# Patient Record
Sex: MALE | Race: WHITE | NOT HISPANIC OR LATINO
[De-identification: names, ages, dates, MRNs, and addresses within clinical notes are randomized per-mention and may not be internally consistent; named-entity substitution may affect disease eponyms.]

---

## 2018-11-08 ENCOUNTER — NON-APPOINTMENT (OUTPATIENT)
Age: 68
End: 2018-11-08

## 2018-11-08 ENCOUNTER — APPOINTMENT (OUTPATIENT)
Dept: CARDIOLOGY | Facility: CLINIC | Age: 68
End: 2018-11-08
Payer: MEDICARE

## 2018-11-08 VITALS
OXYGEN SATURATION: 94 % | DIASTOLIC BLOOD PRESSURE: 82 MMHG | HEIGHT: 70 IN | SYSTOLIC BLOOD PRESSURE: 127 MMHG | HEART RATE: 64 BPM | WEIGHT: 223 LBS | BODY MASS INDEX: 31.92 KG/M2 | RESPIRATION RATE: 14 BRPM

## 2018-11-08 DIAGNOSIS — I45.10 UNSPECIFIED RIGHT BUNDLE-BRANCH BLOCK: ICD-10-CM

## 2018-11-08 DIAGNOSIS — G47.00 INSOMNIA, UNSPECIFIED: ICD-10-CM

## 2018-11-08 PROCEDURE — 99204 OFFICE O/P NEW MOD 45 MIN: CPT | Mod: 25

## 2018-11-08 RX ORDER — MIRABEGRON 50 MG/1
50 TABLET, FILM COATED, EXTENDED RELEASE ORAL
Refills: 0 | Status: ACTIVE | COMMUNITY
Start: 2018-11-08

## 2018-11-08 NOTE — DISCUSSION/SUMMARY
[FreeTextEntry1] : The patient is a 68-year-old [place] speaking gentleman BPH, hypertension with insomnia and heavy snoring. RBBB is unchanged from prior. Recommend sleep study.

## 2018-11-08 NOTE — REVIEW OF SYSTEMS
[Shortness Of Breath] : shortness of breath [Dyspnea on exertion] : not dyspnea during exertion [Chest Pain] : no chest pain [Lower Ext Edema] : no extremity edema [Palpitations] : no palpitations [Negative] : Heme/Lymph

## 2018-11-08 NOTE — PHYSICAL EXAM
[General Appearance - Well Developed] : well developed [Normal Appearance] : normal appearance [Well Groomed] : well groomed [General Appearance - Well Nourished] : well nourished [No Deformities] : no deformities [General Appearance - In No Acute Distress] : no acute distress [Normal Conjunctiva] : the conjunctiva exhibited no abnormalities [Eyelids - No Xanthelasma] : the eyelids demonstrated no xanthelasmas [Normal Oral Mucosa] : normal oral mucosa [No Oral Pallor] : no oral pallor [No Oral Cyanosis] : no oral cyanosis [Normal Jugular Venous A Waves Present] : normal jugular venous A waves present [Normal Jugular Venous V Waves Present] : normal jugular venous V waves present [No Jugular Venous White A Waves] : no jugular venous white A waves [Heart Rate And Rhythm] : heart rate and rhythm were normal [Heart Sounds] : normal S1 and S2 [Murmurs] : no murmurs present [Respiration, Rhythm And Depth] : normal respiratory rhythm and effort [Exaggerated Use Of Accessory Muscles For Inspiration] : no accessory muscle use [Auscultation Breath Sounds / Voice Sounds] : lungs were clear to auscultation bilaterally [Abdomen Soft] : soft [Abdomen Tenderness] : non-tender [Abdomen Mass (___ Cm)] : no abdominal mass palpated [Abnormal Walk] : normal gait [Gait - Sufficient For Exercise Testing] : the gait was sufficient for exercise testing [Nail Clubbing] : no clubbing of the fingernails [Cyanosis, Localized] : no localized cyanosis [Petechial Hemorrhages (___cm)] : no petechial hemorrhages [Skin Color & Pigmentation] : normal skin color and pigmentation [] : no rash [No Venous Stasis] : no venous stasis [Skin Lesions] : no skin lesions [No Skin Ulcers] : no skin ulcer [No Xanthoma] : no  xanthoma was observed [Oriented To Time, Place, And Person] : oriented to person, place, and time [Affect] : the affect was normal [Mood] : the mood was normal [No Anxiety] : not feeling anxious

## 2018-11-08 NOTE — HISTORY OF PRESENT ILLNESS
[FreeTextEntry1] : Minesh is a 68-year-old gentleman hypertension, BPH with insomnia and heavy snoring. He also has RBBB and multiple stress and echo in past with Dr. Jenkins. Here today asking for sleep study.

## 2019-01-14 ENCOUNTER — RESULT CHARGE (OUTPATIENT)
Age: 69
End: 2019-01-14

## 2019-02-10 PROBLEM — I45.10 RIGHT BUNDLE BRANCH BLOCK (RBBB): Status: ACTIVE | Noted: 2018-11-08

## 2019-03-11 ENCOUNTER — APPOINTMENT (OUTPATIENT)
Dept: INTERNAL MEDICINE | Facility: CLINIC | Age: 69
End: 2019-03-11
Payer: MEDICARE

## 2019-03-11 VITALS
OXYGEN SATURATION: 96 % | RESPIRATION RATE: 12 BRPM | HEART RATE: 60 BPM | WEIGHT: 221 LBS | HEIGHT: 70 IN | SYSTOLIC BLOOD PRESSURE: 142 MMHG | TEMPERATURE: 97.9 F | BODY MASS INDEX: 31.64 KG/M2 | DIASTOLIC BLOOD PRESSURE: 81 MMHG

## 2019-03-11 DIAGNOSIS — I10 ESSENTIAL (PRIMARY) HYPERTENSION: ICD-10-CM

## 2019-03-11 PROCEDURE — 99204 OFFICE O/P NEW MOD 45 MIN: CPT

## 2019-03-11 NOTE — PHYSICAL EXAM
[No Acute Distress] : no acute distress [Well Nourished] : well nourished [Well Developed] : well developed [Well-Appearing] : well-appearing [Normal Sclera/Conjunctiva] : normal sclera/conjunctiva [PERRL] : pupils equal round and reactive to light [EOMI] : extraocular movements intact [Normal Outer Ear/Nose] : the outer ears and nose were normal in appearance [Normal Oropharynx] : the oropharynx was normal [No JVD] : no jugular venous distention [Supple] : supple [No Lymphadenopathy] : no lymphadenopathy [No Respiratory Distress] : no respiratory distress  [Clear to Auscultation] : lungs were clear to auscultation bilaterally [No Accessory Muscle Use] : no accessory muscle use [Normal Rate] : normal rate  [Regular Rhythm] : with a regular rhythm [Normal S1, S2] : normal S1 and S2 [No Murmur] : no murmur heard [No Edema] : there was no peripheral edema [No Extremity Clubbing/Cyanosis] : no extremity clubbing/cyanosis [Soft] : abdomen soft [Non Tender] : non-tender [Non-distended] : non-distended [No Masses] : no abdominal mass palpated [No HSM] : no HSM [Normal Bowel Sounds] : normal bowel sounds [Normal Posterior Cervical Nodes] : no posterior cervical lymphadenopathy [Normal Anterior Cervical Nodes] : no anterior cervical lymphadenopathy [No CVA Tenderness] : no CVA  tenderness [No Joint Swelling] : no joint swelling [Grossly Normal Strength/Tone] : grossly normal strength/tone [No Rash] : no rash [Normal Gait] : normal gait [Coordination Grossly Intact] : coordination grossly intact [No Focal Deficits] : no focal deficits [Normal Affect] : the affect was normal [Normal Insight/Judgement] : insight and judgment were intact

## 2019-04-08 NOTE — HISTORY OF PRESENT ILLNESS
[de-identified] : 67 yo male, with Hx of HTN, BPH, presents for initial evaluation/establish care\par Reports compliance with taking his meds daily\par He is doing well, offers no complaints.\par

## 2019-04-08 NOTE — ASSESSMENT
[FreeTextEntry1] : HTN, BPH\par conrt current meds\par reinforced low salt diet\par \par f/u in 3 months

## 2020-01-21 ENCOUNTER — APPOINTMENT (OUTPATIENT)
Dept: ORTHOPEDIC SURGERY | Facility: CLINIC | Age: 70
End: 2020-01-21
Payer: MEDICARE

## 2020-01-21 PROCEDURE — 99204 OFFICE O/P NEW MOD 45 MIN: CPT

## 2020-01-21 PROCEDURE — 73562 X-RAY EXAM OF KNEE 3: CPT | Mod: 50

## 2020-01-21 RX ORDER — METHYLPREDNISOLONE 4 MG/1
4 TABLET ORAL
Qty: 1 | Refills: 0 | Status: ACTIVE | COMMUNITY
Start: 2020-01-21 | End: 1900-01-01

## 2020-01-21 NOTE — HISTORY OF PRESENT ILLNESS
[de-identified] : patient is here to complaints of left knee pain and also lumbar pain patient states he's been having difficulty with low back discomfort for at least 3 months. He has some radiating pain to the left thigh and leg. He denies any obvious weakness and also denies any change in bowel or bladder habits. Patient is complaining of left knee pain as well. This is about3 months duration as well.patient has an outside MRI which shows fairly severe degenerative changes of the L4-5 intervertebral disc as well as foraminal narrowing at that level.

## 2020-01-21 NOTE — REASON FOR VISIT
[Initial Visit] : an initial visit for [FreeTextEntry2] : RIGHT LOW BACK PAIN AND ALSO LEFT KNEE PAIN. - HAS MRI FOR LOW BACK AND SENT FOR XRAYS OF KNEES

## 2020-01-21 NOTE — DISCUSSION/SUMMARY
[de-identified] : patient placed on a Medrol Dosepak. Return in a week if not improved a referral to pain management specialist may be warranted.

## 2020-01-21 NOTE — PHYSICAL EXAM
[de-identified] : AP standing individual lateral and sunrise views of both knees were obtained show well-preserved joint space in all 3 compartments of both knees. [de-identified] : patient has moderate paraspinal tenderness palpation of the lower lumbar segments. Has a full passive external and internal rotation of the left hip negative straight leg raising test. He is also noted to not have any significant atrophy of the  left quadriceps as compared to the right.\par \par Left knee was examined patient has no evidence of any warmth there is no medial or lateral joint line tenderness range of motion 0-125°.

## 2020-02-04 ENCOUNTER — APPOINTMENT (OUTPATIENT)
Dept: ORTHOPEDIC SURGERY | Facility: CLINIC | Age: 70
End: 2020-02-04

## 2020-11-09 ENCOUNTER — APPOINTMENT (OUTPATIENT)
Dept: DISASTER EMERGENCY | Facility: CLINIC | Age: 70
End: 2020-11-09

## 2020-11-09 DIAGNOSIS — Z01.818 ENCOUNTER FOR OTHER PREPROCEDURAL EXAMINATION: ICD-10-CM

## 2020-11-10 LAB — SARS-COV-2 N GENE NPH QL NAA+PROBE: NOT DETECTED

## 2020-11-19 ENCOUNTER — APPOINTMENT (OUTPATIENT)
Dept: ORTHOPEDIC SURGERY | Facility: CLINIC | Age: 70
End: 2020-11-19
Payer: MEDICARE

## 2020-11-19 PROCEDURE — 99214 OFFICE O/P EST MOD 30 MIN: CPT

## 2020-11-19 NOTE — PHYSICAL EXAM
[de-identified] : patient has moderate paraspinal tenderness palpation of the lower lumbar segments. Has a full passive external and internal rotation of the left hip negative straight leg raising test. He is also noted to not have any significant atrophy of the  left quadriceps as compared to the right.\par \par Left knee was examined patient has no evidence of any warmth there is no medial or lateral joint line tenderness range of motion 0-125°.

## 2020-11-19 NOTE — REASON FOR VISIT
[Follow-Up Visit] : a follow-up visit for [FreeTextEntry2] : LEFT LOW BACK AND LEG WEAKNESS - TRAVELS DOWN THE LEG

## 2020-11-19 NOTE — HISTORY OF PRESENT ILLNESS
[de-identified] : Patient was seen in January of this year with similar complaints that he has today which is low back pain left-sided with pain radiating into his groin thigh and leg. He states he has difficulty getting it seated position denies any weakness or numbness of the left lower extremity or a change in bowel or bladder habits. Patient states that he can not walk very far and has decreased ambulatory range. He currently states that he is also limping.\par \par He does not recall of the Medrol Dosepak previously prescribed and is felt he states that he's had 4 epidural injections with no relief.

## 2020-11-19 NOTE — DISCUSSION/SUMMARY
[de-identified] : Patient has an MRI that's about a year-old we will send her for new MRI before advising him on his next that.

## 2020-12-01 ENCOUNTER — APPOINTMENT (OUTPATIENT)
Dept: ORTHOPEDIC SURGERY | Facility: CLINIC | Age: 70
End: 2020-12-01
Payer: MEDICARE

## 2020-12-01 DIAGNOSIS — M54.2 CERVICALGIA: ICD-10-CM

## 2020-12-01 DIAGNOSIS — R20.0 ANESTHESIA OF SKIN: ICD-10-CM

## 2020-12-01 DIAGNOSIS — G95.9 DISEASE OF SPINAL CORD, UNSPECIFIED: ICD-10-CM

## 2020-12-01 PROCEDURE — 72110 X-RAY EXAM L-2 SPINE 4/>VWS: CPT

## 2020-12-01 PROCEDURE — 99214 OFFICE O/P EST MOD 30 MIN: CPT

## 2020-12-01 PROCEDURE — 72050 X-RAY EXAM NECK SPINE 4/5VWS: CPT

## 2020-12-15 NOTE — PHYSICAL EXAM
[de-identified] : General: No acute distress, conversant, well-nourished.\par Head: Normocephalic, atraumatic\par Neck: trachea midline, FROM\par Heart: normotensive and normal rate and rhythm\par Lungs: No labored breathing\par Skin: No abrasions, no rashes, no edema\par Psych: Alert and oriented to person, place and time\par Extremities: no peripheral edema or digital cyanosis\par Gait: Wide based gait.  Unable to do tandem gait  \par Vascular: warm and well perfused distally, palpable distal pulses\par \par MSK:\par Lumbar spine:\par No tenderness to palpation.  No step-off, no deformity.\par \par NEURO EXAM:\par Sensation \par Left L2  -  2/2            \par Left L3  -  2/2\par Left L4  -  2/2\par Left L5  -  2/2\par Left S1  -  2/2\par \par Right L2  -  2/2            \par Right L3  -  2/2\par Right L4  -  2/2\par Right L5  -  2/2\par Right S1  -  2/2\par \par Motor: \par Left L2 (hip flexion)                            5/5                \par Left L3 (knee extension)                   5/5                \par Left L4 (ankle dorsiflexion)                 5/5                \par Left L5 (long toe extensor)                5/5                \par Left S1 (ankle plantar flexion)           5/5\par \par Right L2 (hip flexion)                            5/5                \par Right L3 (knee extension)                   5/5                \par Right L4 (ankle dorsiflexion)                 5/5                \par Right L5 (long toe extensor)                5/5                \par Right S1 (ankle plantar flexion)           5/5\par \par Reflexes: Normal and symmetric\par Negative clonus.  Down-going Babinski.   [de-identified] : Cervical AP, lateral, flexion and extension radiographs obtained in the office today shows no fracture or dislocation.  Cervical spondylosis. Mild C4-C5 spondylolisthesis.  \par \par Lumbar AP, lateral, flexion and extension radiographs obtained in the office today shows no fracture or dislocation.  Lumbar spondylosis.  Significant loss of disc height at L5-S1.  No instability on dynamic series.

## 2020-12-15 NOTE — ASSESSMENT
[FreeTextEntry1] : 70 year old male with low back pain radiating to left leg.  He also reports feeling weak in his legs when walking and when trying to get up.  He cannot do a tandem gait and has a wide based gait.  He will be sent for MRI C/T/L.  He will be sent for neurology consultation.  His radicular pain is likely related to his L5-S1 bilateral foraminal stenosis but it is not the cause of his changes in balance or feelings lower extremity weakness.  He will followup once his imaging has been completed.  He knows to call with any questions or concerns or if his symptoms acutely worsen.

## 2020-12-15 NOTE — HISTORY OF PRESENT ILLNESS
[de-identified] : 70 year old male referred by Dr. Cheek with low back pain radiating into his left leg.  He also reports feel weakness in his legs.  He also reports changes in balance.  He denies any numbness, paresthesias, fine motor deficits, urinary retention or fecal incontinence.  He has not been taking any medications regularly for pain.  He says walking makes it worse.  Rest helps.

## 2021-02-10 ENCOUNTER — APPOINTMENT (OUTPATIENT)
Dept: ORTHOPEDIC SURGERY | Facility: CLINIC | Age: 71
End: 2021-02-10
Payer: MEDICARE

## 2021-02-10 DIAGNOSIS — M54.5 LOW BACK PAIN: ICD-10-CM

## 2021-02-10 DIAGNOSIS — R29.898 OTHER SYMPTOMS AND SIGNS INVOLVING THE MUSCULOSKELETAL SYSTEM: ICD-10-CM

## 2021-02-10 DIAGNOSIS — R26.89 OTHER ABNORMALITIES OF GAIT AND MOBILITY: ICD-10-CM

## 2021-02-10 PROCEDURE — 99214 OFFICE O/P EST MOD 30 MIN: CPT

## 2021-02-11 NOTE — ASSESSMENT
[FreeTextEntry1] : 70 year old male with low back pain radiating to left leg. He says the radicular symptoms come in rare episodes.  He is not taking any medication for pain since the radicular episodes are not that painful.  His main issue is his balance problems and his feelings of lower extremity weakness.  His radicular symptoms are likely from his L5-S1 degenerative changes.  However his weakness and balance problems are not from this level.  He has seen Dr. Soriano, a neurologist, who referred him to a movement disorder specialist.  The patient has stable hydrocephalus and a history of meningitis as a child.  I spoke to Dr. Soriano and the patient during the appointment and the patient is aware that he should see a movement disorder specialist.  He understands that spine surgery would not help his balance problems.  I also spoke with the patient's son, Yann, who is a physician.  Yann understands and agrees with the plan.  The patient will followup with a movement disorder specialist.  He will also work with physical therapy.  He will call to make a followup appointment.  He knows to call with any questions or concerns or if his symptoms acutely worsen.

## 2021-02-11 NOTE — PHYSICAL EXAM
[de-identified] : General: No acute distress, conversant, well-nourished.\par Head: Normocephalic, atraumatic\par Neck: trachea midline, FROM\par Heart: normotensive and normal rate and rhythm\par Lungs: No labored breathing\par Skin: No abrasions, no rashes, no edema\par Psych: Alert and oriented to person, place and time\par Extremities: no peripheral edema or digital cyanosis\par Gait: Wide based gait.  Unable to do tandem gait  \par Vascular: warm and well perfused distally, palpable distal pulses\par \par MSK:\par Lumbar spine:\par No tenderness to palpation.  No step-off, no deformity.\par \par NEURO EXAM:\par Sensation \par Left L2  -  2/2            \par Left L3  -  2/2\par Left L4  -  2/2\par Left L5  -  2/2\par Left S1  -  2/2\par \par Right L2  -  2/2            \par Right L3  -  2/2\par Right L4  -  2/2\par Right L5  -  2/2\par Right S1  -  2/2\par \par Motor: \par Left L2 (hip flexion)                            5/5                \par Left L3 (knee extension)                   5/5                \par Left L4 (ankle dorsiflexion)                 5/5                \par Left L5 (long toe extensor)                5/5                \par Left S1 (ankle plantar flexion)           5/5\par \par Right L2 (hip flexion)                            5/5                \par Right L3 (knee extension)                   5/5                \par Right L4 (ankle dorsiflexion)                 5/5                \par Right L5 (long toe extensor)                5/5                \par Right S1 (ankle plantar flexion)           5/5\par \par Reflexes: Normal and symmetric\par Negative clonus.  Down-going Babinski.   [de-identified] : Cervical AP, lateral, flexion and extension radiographs shjow no fracture or dislocation.  Cervical spondylosis. Mild C4-C5 spondylolisthesis.  \par \par Lumbar AP, lateral, flexion and extension radiographs show no fracture or dislocation.  Lumbar spondylosis.  Significant loss of disc height at L5-S1.  No instability on dynamic series.  \par \par MR Cervical (12/1/20): 1. Based on the localizer, severe, likely chronic, hydrocephalus involving the third and more significantly lateral ventricles. However this needs to be further evaluated with an MRI of the brain.\par 2. C4-5 disc bulge and posterior annular fissure. Hypertrophy of the ligamentum flavum. Borderline spinal stenosis.\par 3. C5-6 disc bulge, without significant thecal sac compression.\par 4. Facet arthrosis, more significant and severe left-sided with foraminal stenosis.\par \par MRI Thoracic (12/1/20): Mild dextroscoliosis. No disc herniation, central canal stenosis or neural foraminal narrowing at any thoracic level.\par \par MRI Lumbar (12/1/20): Multilevel lumbar disc disease resulting in contact with the exiting L5 nerve roots as well as the right S1 nerve root at the level of L5/S1.  Severe intervertebral disc space narrowing at L5/S1 with hyperintense T2 signal involving the intervertebral disc space; the hyperintense T2 signal is presumably degenerative in nature and there are no edematous changes involving the adjacent vertebral body endplates however if there is concern for discitis then postcontrast T1-weighted images are recommended for further evaluation.\par

## 2021-08-15 NOTE — HISTORY OF PRESENT ILLNESS
[de-identified] : 70 year old male returns for followup .  He saw Dr. Brett Soriano, a neurologist, for his balance issues.  He did not recall what Dr. Soriano told him.  We telephoned Dr. Soriano who reported that he saw the patient had meningitis when he was younger and also has stable hydrocephalus.  He recommended that the patient see a movement disorder specialist and also get worked up for Parkinson's.  He agrees that the lumbar pathology would not cause the patient's balance issues.  The patient does report some pain radiating into his left leg.  He says it is episodic.  He does not take any medication for pain.  He denies recent illness, fevers, numbness, saddle anesthesia, urinary retention or fecal incontinence. A [place] language line video  was used for the entire encounter.   today